# Patient Record
Sex: MALE | Race: WHITE | Employment: FULL TIME | URBAN - METROPOLITAN AREA
[De-identification: names, ages, dates, MRNs, and addresses within clinical notes are randomized per-mention and may not be internally consistent; named-entity substitution may affect disease eponyms.]

---
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
## 2025-01-10 NOTE — ED NOTES
Matty England MD reviewed discharge instructions with the patient. The patient verbalized understanding. All questions and concerns were addressed. The patient declined a wheelchair and is discharged ambulatory with instructions and prescriptions in hand. Pt is alert and oriented x 4. Respirations are clear and unlabored. Patient has no family in the area and no contact info for anyone, no means of transportation, ride arranged to bus station.
Patient arrived by EMS, states he was skateboarding this morning and started to feel extremely dizzy. He states he has never had a feeling like this before and was worried he was going to have a seizure. Patient is resting comfortably, no neuro deficits noted on exam, but patient states room is still spinning some and his distance vision is blurry. Vitals stable. Patient takes [address] for seizures starting about a month ago, is compliant with treatment.
Pt reports he has no way to get back home to [place] and no one to call. Reports he was traveling to [place] with a friend this morning but that neither his friend or the patient have a cell phone. Pt reports that his family cannot come to pick him because \"their truck is broke\". Pt unsure of the address or name of the person whom he will be visiting in [place]. Pt given lyft ticket to the [employer] station in [place] and reports he will figure out how to get to [place] from there.
Still waiting on Keppra from [employer]
richelle wipes in [place]/done